# Patient Record
Sex: MALE | Race: WHITE | NOT HISPANIC OR LATINO | Employment: OTHER | URBAN - METROPOLITAN AREA
[De-identification: names, ages, dates, MRNs, and addresses within clinical notes are randomized per-mention and may not be internally consistent; named-entity substitution may affect disease eponyms.]

---

## 2024-07-23 ENCOUNTER — OFFICE VISIT (OUTPATIENT)
Dept: CARDIOLOGY | Facility: CLINIC | Age: 64
End: 2024-07-23
Payer: MEDICARE

## 2024-07-23 VITALS
HEIGHT: 68 IN | OXYGEN SATURATION: 98 % | DIASTOLIC BLOOD PRESSURE: 76 MMHG | WEIGHT: 155 LBS | SYSTOLIC BLOOD PRESSURE: 126 MMHG | HEART RATE: 75 BPM | BODY MASS INDEX: 23.49 KG/M2

## 2024-07-23 DIAGNOSIS — I10 PRIMARY HYPERTENSION: ICD-10-CM

## 2024-07-23 DIAGNOSIS — E78.2 MIXED HYPERLIPIDEMIA: ICD-10-CM

## 2024-07-23 DIAGNOSIS — I25.10 CORONARY ARTERY DISEASE INVOLVING NATIVE CORONARY ARTERY OF NATIVE HEART WITHOUT ANGINA PECTORIS: Primary | ICD-10-CM

## 2024-07-23 PROCEDURE — 93000 ELECTROCARDIOGRAM COMPLETE: CPT | Performed by: INTERNAL MEDICINE

## 2024-07-23 PROCEDURE — 3074F SYST BP LT 130 MM HG: CPT | Performed by: INTERNAL MEDICINE

## 2024-07-23 PROCEDURE — 3078F DIAST BP <80 MM HG: CPT | Performed by: INTERNAL MEDICINE

## 2024-07-23 PROCEDURE — 1160F RVW MEDS BY RX/DR IN RCRD: CPT | Performed by: INTERNAL MEDICINE

## 2024-07-23 PROCEDURE — 99203 OFFICE O/P NEW LOW 30 MIN: CPT | Performed by: INTERNAL MEDICINE

## 2024-07-23 PROCEDURE — 1159F MED LIST DOCD IN RCRD: CPT | Performed by: INTERNAL MEDICINE

## 2024-07-23 RX ORDER — TAMSULOSIN HYDROCHLORIDE 0.4 MG/1
1 CAPSULE ORAL DAILY
COMMUNITY
Start: 2024-07-13

## 2024-07-23 RX ORDER — SODIUM CHLORIDE 1 G/1
1 TABLET ORAL 2 TIMES DAILY
COMMUNITY

## 2024-07-23 RX ORDER — LISINOPRIL 30 MG/1
30 TABLET ORAL DAILY
COMMUNITY
Start: 2024-04-29

## 2024-07-23 RX ORDER — ASPIRIN 81 MG/1
1 TABLET, CHEWABLE ORAL DAILY
COMMUNITY

## 2024-07-23 RX ORDER — CLOPIDOGREL BISULFATE 75 MG/1
1 TABLET ORAL DAILY
COMMUNITY
Start: 2024-06-03

## 2024-07-23 RX ORDER — LEVOTHYROXINE SODIUM 0.1 MG/1
TABLET ORAL EVERY 24 HOURS
COMMUNITY

## 2024-07-23 RX ORDER — EZETIMIBE 10 MG/1
1 TABLET ORAL DAILY
COMMUNITY

## 2024-07-23 RX ORDER — TIZANIDINE 4 MG/1
4 TABLET ORAL EVERY 6 HOURS PRN
COMMUNITY
Start: 2024-06-03

## 2024-07-23 NOTE — PROGRESS NOTES
Cardiology Office Visit      Encounter Date:  07/23/2024    Patient ID:   Deshawn García is a 63 y.o. male.    Reason For Followup:  Coronary artery disease  Shortness of breath      Brief Clinical History:  Dear Viviana Horvath APRN    I had the pleasure of seeing Deshawn García today. As you are well aware, this is a 63 y.o. male with medical history significant for history of hypertension hyperlipidemia history of prior tobacco use prior coronary bypass surgery 8 years back with no recent cardiac evaluation workup here to establish care    Interval History:  No symptoms of fatigue and shortness of breath  No chest discomfort  Significant functional limitation secondary to back and knee pain  Significant recent weight loss  Chronic hyponatremia  Orthopnea no PND  No dizziness or syncope        Assessment & Plan    Impressions:  Artery disease prior coronary bypass surgery  Hypertension  Hyperlipidemia  Prior history of tobacco use  Significant back and knee problems limiting functional physical activity  Chronic hyponatremia  Recent significant weight loss  Coronary artery calcification on recent CT scan  Lung nodule  Intolerance to statins  Patient is currently on TF for elevated lipids    Recommendations:  Follow-up with primary care physician nephrology and pain clinic as scheduled  Schedule patient for an echocardiogram to assess LV systolic function and rule out any significant structural heart disease or valve pathology  Schedule patient for Lexiscan myocardial perfusion study to assess the ischemic burden with a prior history of remote coronary artery bypass surgery with no recent cardiac evaluation and significant functional imitation and cardiac symptoms that are concerning for angina  Continue close monitoring and follow-up  Prior available medical records reviewed and discussed with the patient  Need for follow-up with primary care physician for recent significant weight loss reviewed and discussed with  "patient  Follow-up with nephrology for hyponatremia  Continue current medical therapy with lisinopril 30 mg p.o. once a day aspirin 81 mg p.o. once a day Plavix 75 mg p.o. once a day Zetia 10 mg p.o. once a day  Continue close monitoring and follow-up  Follow-up in office in 3 months        Vitals:  Vitals:    07/23/24 1038   BP: 126/76   Pulse: 75   SpO2: 98%   Weight: 70.3 kg (155 lb)   Height: 172.7 cm (68\")       Physical Exam:    General: Alert, cooperative, no distress, appears stated age  Head:  Normocephalic, atraumatic, mucous membranes moist  Eyes:  Conjunctiva/corneas clear, EOM's intact     Neck:  Supple,  no adenopathy;      Lungs: Clear to auscultation bilaterally, no wheezes rhonchi rales are noted  Chest wall: No tenderness  Heart::  Regular rate and rhythm, S1 and S2 normal, no murmur, rub or gallop  Abdomen: Soft, non-tender, nondistended bowel sounds active  Extremities: No cyanosis, clubbing, or edema  Pulses: 2+ and symmetric all extremities  Skin:  No rashes or lesions  Neuro/psych: A&O x3. CN II through XII are grossly intact with appropriate affect              Lab Results   Component Value Date    BUN 13 02/11/2022    CREATININE 0.74 02/11/2022    BCR 17.6 02/11/2022    K 4.3 02/11/2022    CO2 23 02/11/2022    CALCIUM 8.6 02/11/2022    ALBUMIN 4.2 02/11/2022    BILITOT 0.3 02/11/2022    AST 30 02/11/2022    ALT 12 02/11/2022        No results found for this or any previous visit.     Lab Results   Component Value Date    CHLPL 307 (H) 02/21/2019    TRIG 130 02/21/2019    HDL 41 02/21/2019     (H) 02/21/2019                Objective:          Allergies:  Allergies   Allergen Reactions    Statins Other (See Comments)     Severe myalgia    Codeine Unknown - Low Severity    Sulfamethoxazole-Trimethoprim Nausea Only       Medication Review:     Current Outpatient Medications:     aspirin 81 MG chewable tablet, Chew 1 tablet Daily., Disp: , Rfl:     clopidogrel (PLAVIX) 75 MG tablet, Take 1 " tablet by mouth Daily., Disp: , Rfl:     ezetimibe (ZETIA) 10 MG tablet, Take 1 tablet by mouth Daily., Disp: , Rfl:     levothyroxine (SYNTHROID, LEVOTHROID) 100 MCG tablet, Daily., Disp: , Rfl:     lisinopril (PRINIVIL,ZESTRIL) 30 MG tablet, Take 1 tablet by mouth Daily., Disp: , Rfl:     sodium chloride 1 g tablet, Take 1 tablet by mouth 2 (Two) Times a Day., Disp: , Rfl:     tamsulosin (FLOMAX) 0.4 MG capsule 24 hr capsule, Take 1 capsule by mouth Daily., Disp: , Rfl:     tiZANidine (ZANAFLEX) 4 MG tablet, Take 1 tablet by mouth Every 6 (Six) Hours As Needed for Muscle Spasms., Disp: , Rfl:     Family History:  Family History   Problem Relation Age of Onset    Hyperlipidemia Mother     Hypertension Mother     Heart disease Mother     Heart disease Father        Past Medical History:  Past Medical History:   Diagnosis Date    COPD (chronic obstructive pulmonary disease)     Coronary artery disease     Hyperlipidemia     Hypertension     Myocardial infarction     Stroke        Past surgical History:  Past Surgical History:   Procedure Laterality Date    BACK SURGERY  1987    CARDIAC CATHETERIZATION      CORONARY ARTERY BYPASS GRAFT         Social History:  Social History     Socioeconomic History    Marital status:    Tobacco Use    Smoking status: Former     Types: Cigarettes     Passive exposure: Past    Smokeless tobacco: Current   Vaping Use    Vaping status: Never Used   Substance and Sexual Activity    Alcohol use: Not Currently    Drug use: Not Currently     Types: Marijuana    Sexual activity: Defer       Review of Systems:  The following systems were reviewed as they relate to the cardiovascular system: Constitutional, Eyes, ENT, Cardiovascular, Respiratory, Gastrointestinal, Integumentary, Neurological, Psychiatric, Hematologic, Endocrine, Musculoskeletal, and Genitourinary. The pertinent cardiovascular findings are reported above with all other cardiovascular points within those systems being  negative.    Diagnostic Study Review:     Current Electrocardiogram:    ECG 12 Lead    Date/Time: 7/23/2024 10:49 AM  Performed by: Yin Bourgeois MD    Authorized by: Yin Bourgeois MD  Comparison: compared with previous ECG   Similar to previous ECG  Rhythm: sinus rhythm  Rate: normal  BPM: 82  Conduction: conduction normal  ST Segments: ST segments normal  T Waves: T waves normal  QRS axis: normal    Clinical impression: normal ECG                NOTE: The following portions of the patient's history were reviewed and updated this visit as appropriate: allergies, current medications, past family history, past medical history, past social history, past surgical history and problem list.

## 2024-07-24 ENCOUNTER — PATIENT ROUNDING (BHMG ONLY) (OUTPATIENT)
Dept: CARDIOLOGY | Facility: CLINIC | Age: 64
End: 2024-07-24
Payer: MEDICARE

## 2024-07-24 NOTE — PROGRESS NOTES
A My-Chart message has been sent to the patient for PATIENT ROUNDING with [facility].

## 2024-08-05 ENCOUNTER — APPOINTMENT (OUTPATIENT)
Dept: CARDIOLOGY | Facility: HOSPITAL | Age: 64
End: 2024-08-05
Payer: MEDICARE

## 2024-08-05 ENCOUNTER — HOSPITAL ENCOUNTER (OUTPATIENT)
Dept: CARDIOLOGY | Facility: HOSPITAL | Age: 64
Discharge: HOME | End: 2024-08-05
Admitting: INTERNAL MEDICINE
Payer: MEDICARE

## 2024-08-05 VITALS
BODY MASS INDEX: 23.49 KG/M2 | DIASTOLIC BLOOD PRESSURE: 76 MMHG | SYSTOLIC BLOOD PRESSURE: 126 MMHG | HEIGHT: 68 IN | WEIGHT: 155 LBS

## 2024-08-05 DIAGNOSIS — I10 PRIMARY HYPERTENSION: ICD-10-CM

## 2024-08-05 DIAGNOSIS — I25.10 CORONARY ARTERY DISEASE INVOLVING NATIVE CORONARY ARTERY OF NATIVE HEART WITHOUT ANGINA PECTORIS: ICD-10-CM

## 2024-08-05 DIAGNOSIS — E78.2 MIXED HYPERLIPIDEMIA: ICD-10-CM

## 2024-08-05 LAB
BH CV ECHO LEFT VENTRICLE GLOBAL LONGITUDINAL STRAIN: 18.6 %
BH CV ECHO MEAS - ACS: 1.79 CM
BH CV ECHO MEAS - AI P1/2T: 601.2 MSEC
BH CV ECHO MEAS - AO MAX PG: 6.5 MMHG
BH CV ECHO MEAS - AO MEAN PG: 3.6 MMHG
BH CV ECHO MEAS - AO ROOT DIAM: 3.4 CM
BH CV ECHO MEAS - AO V2 MAX: 127.9 CM/SEC
BH CV ECHO MEAS - AO V2 VTI: 25.3 CM
BH CV ECHO MEAS - AVA(I,D): 2.49 CM2
BH CV ECHO MEAS - EDV(CUBED): 154.9 ML
BH CV ECHO MEAS - EDV(MOD-SP4): 78.6 ML
BH CV ECHO MEAS - EF(MOD-BP): 61 %
BH CV ECHO MEAS - EF(MOD-SP4): 60.6 %
BH CV ECHO MEAS - ESV(CUBED): 47.8 ML
BH CV ECHO MEAS - ESV(MOD-SP4): 31 ML
BH CV ECHO MEAS - FS: 32.4 %
BH CV ECHO MEAS - IVS/LVPW: 0.99 CM
BH CV ECHO MEAS - IVSD: 0.97 CM
BH CV ECHO MEAS - LA DIMENSION: 4.4 CM
BH CV ECHO MEAS - LV DIASTOLIC VOL/BSA (35-75): 42.9 CM2
BH CV ECHO MEAS - LV MASS(C)D: 196.8 GRAMS
BH CV ECHO MEAS - LV MAX PG: 3.6 MMHG
BH CV ECHO MEAS - LV MEAN PG: 1.98 MMHG
BH CV ECHO MEAS - LV SYSTOLIC VOL/BSA (12-30): 16.9 CM2
BH CV ECHO MEAS - LV V1 MAX: 95 CM/SEC
BH CV ECHO MEAS - LV V1 VTI: 19.2 CM
BH CV ECHO MEAS - LVIDD: 5.4 CM
BH CV ECHO MEAS - LVIDS: 3.6 CM
BH CV ECHO MEAS - LVOT AREA: 3.3 CM2
BH CV ECHO MEAS - LVOT DIAM: 2.04 CM
BH CV ECHO MEAS - LVPWD: 0.98 CM
BH CV ECHO MEAS - MR MAX PG: 123.5 MMHG
BH CV ECHO MEAS - MR MAX VEL: 555.5 CM/SEC
BH CV ECHO MEAS - MV A MAX VEL: 60.5 CM/SEC
BH CV ECHO MEAS - MV DEC SLOPE: 141.8 CM/SEC2
BH CV ECHO MEAS - MV DEC TIME: 0.28 SEC
BH CV ECHO MEAS - MV E MAX VEL: 39.4 CM/SEC
BH CV ECHO MEAS - MV E/A: 0.65
BH CV ECHO MEAS - MV MAX PG: 1.82 MMHG
BH CV ECHO MEAS - MV MEAN PG: 0.69 MMHG
BH CV ECHO MEAS - MV V2 VTI: 21 CM
BH CV ECHO MEAS - MVA(VTI): 3 CM2
BH CV ECHO MEAS - PA ACC TIME: 0.13 SEC
BH CV ECHO MEAS - PA V2 MAX: 82 CM/SEC
BH CV ECHO MEAS - RAP SYSTOLE: 3 MMHG
BH CV ECHO MEAS - RV MAX PG: 2.3 MMHG
BH CV ECHO MEAS - RV V1 MAX: 75.9 CM/SEC
BH CV ECHO MEAS - RV V1 VTI: 16.2 CM
BH CV ECHO MEAS - RVDD: 3.3 CM
BH CV ECHO MEAS - RVSP: 20.6 MMHG
BH CV ECHO MEAS - SV(LVOT): 62.9 ML
BH CV ECHO MEAS - SV(MOD-SP4): 47.6 ML
BH CV ECHO MEAS - SVI(LVOT): 34.3 ML/M2
BH CV ECHO MEAS - SVI(MOD-SP4): 26 ML/M2
BH CV ECHO MEAS - TR MAX PG: 17.6 MMHG
BH CV ECHO MEAS - TR MAX VEL: 209.6 CM/SEC

## 2024-08-05 PROCEDURE — 93356 MYOCRD STRAIN IMG SPCKL TRCK: CPT

## 2024-08-05 PROCEDURE — 93306 TTE W/DOPPLER COMPLETE: CPT | Performed by: INTERNAL MEDICINE

## 2024-08-05 PROCEDURE — 93356 MYOCRD STRAIN IMG SPCKL TRCK: CPT | Performed by: INTERNAL MEDICINE

## 2024-08-05 PROCEDURE — 93306 TTE W/DOPPLER COMPLETE: CPT

## 2024-08-08 ENCOUNTER — HOSPITAL ENCOUNTER (OUTPATIENT)
Dept: CARDIOLOGY | Facility: HOSPITAL | Age: 64
Discharge: HOME | End: 2024-08-08
Payer: MEDICARE

## 2024-08-08 DIAGNOSIS — E78.2 MIXED HYPERLIPIDEMIA: ICD-10-CM

## 2024-08-08 DIAGNOSIS — I25.10 CORONARY ARTERY DISEASE INVOLVING NATIVE CORONARY ARTERY OF NATIVE HEART WITHOUT ANGINA PECTORIS: ICD-10-CM

## 2024-08-08 DIAGNOSIS — I10 PRIMARY HYPERTENSION: ICD-10-CM

## 2024-08-08 LAB
BH CV REST NUCLEAR ISOTOPE DOSE: 11.7 MCI
BH CV STRESS COMMENTS STAGE 1: NORMAL
BH CV STRESS DOSE REGADENOSON STAGE 1: 0.4
BH CV STRESS DURATION MIN STAGE 1: 0
BH CV STRESS DURATION SEC STAGE 1: 10
BH CV STRESS NUCLEAR ISOTOPE DOSE: 33.8 MCI
BH CV STRESS PROTOCOL 1: NORMAL
BH CV STRESS RECOVERY BP: NORMAL MMHG
BH CV STRESS RECOVERY HR: 75 BPM
BH CV STRESS STAGE 1: 1
LV EF NUC BP: 61 %
MAXIMAL PREDICTED HEART RATE: 156 BPM
PERCENT MAX PREDICTED HR: 58.97 %
STRESS BASELINE BP: NORMAL MMHG
STRESS BASELINE HR: 74 BPM
STRESS PERCENT HR: 69 %
STRESS POST PEAK BP: NORMAL MMHG
STRESS POST PEAK HR: 92 BPM
STRESS TARGET HR: 133 BPM

## 2024-08-08 PROCEDURE — 25010000002 REGADENOSON 0.4 MG/5ML SOLUTION: Performed by: INTERNAL MEDICINE

## 2024-08-08 PROCEDURE — 0 TECHNETIUM SESTAMIBI: Performed by: INTERNAL MEDICINE

## 2024-08-08 PROCEDURE — A9500 TC99M SESTAMIBI: HCPCS | Performed by: INTERNAL MEDICINE

## 2024-08-08 PROCEDURE — 78452 HT MUSCLE IMAGE SPECT MULT: CPT

## 2024-08-08 PROCEDURE — 93017 CV STRESS TEST TRACING ONLY: CPT

## 2024-08-08 RX ORDER — REGADENOSON 0.08 MG/ML
0.4 INJECTION, SOLUTION INTRAVENOUS
Status: COMPLETED | OUTPATIENT
Start: 2024-08-08 | End: 2024-08-08

## 2024-08-08 RX ADMIN — TECHNETIUM TC 99M SESTAMIBI 1 DOSE: 1 INJECTION INTRAVENOUS at 11:53

## 2024-08-08 RX ADMIN — TECHNETIUM TC 99M SESTAMIBI 1 DOSE: 1 INJECTION INTRAVENOUS at 10:46

## 2024-08-08 RX ADMIN — REGADENOSON 0.4 MG: 0.08 INJECTION, SOLUTION INTRAVENOUS at 11:52

## 2024-12-26 ENCOUNTER — OFFICE VISIT (OUTPATIENT)
Dept: CARDIOLOGY | Facility: CLINIC | Age: 64
End: 2024-12-26
Payer: MEDICARE

## 2024-12-26 VITALS
HEART RATE: 61 BPM | HEIGHT: 68 IN | WEIGHT: 164.4 LBS | BODY MASS INDEX: 24.92 KG/M2 | SYSTOLIC BLOOD PRESSURE: 159 MMHG | OXYGEN SATURATION: 98 % | DIASTOLIC BLOOD PRESSURE: 90 MMHG

## 2024-12-26 DIAGNOSIS — E78.2 MIXED HYPERLIPIDEMIA: ICD-10-CM

## 2024-12-26 DIAGNOSIS — I10 PRIMARY HYPERTENSION: ICD-10-CM

## 2024-12-26 DIAGNOSIS — I25.10 CORONARY ARTERY DISEASE INVOLVING NATIVE CORONARY ARTERY OF NATIVE HEART WITHOUT ANGINA PECTORIS: Primary | ICD-10-CM

## 2024-12-26 PROCEDURE — 99214 OFFICE O/P EST MOD 30 MIN: CPT | Performed by: INTERNAL MEDICINE

## 2024-12-26 PROCEDURE — 3080F DIAST BP >= 90 MM HG: CPT | Performed by: INTERNAL MEDICINE

## 2024-12-26 PROCEDURE — 1160F RVW MEDS BY RX/DR IN RCRD: CPT | Performed by: INTERNAL MEDICINE

## 2024-12-26 PROCEDURE — 93000 ELECTROCARDIOGRAM COMPLETE: CPT | Performed by: INTERNAL MEDICINE

## 2024-12-26 PROCEDURE — 1159F MED LIST DOCD IN RCRD: CPT | Performed by: INTERNAL MEDICINE

## 2024-12-26 PROCEDURE — 3077F SYST BP >= 140 MM HG: CPT | Performed by: INTERNAL MEDICINE

## 2024-12-26 NOTE — PROGRESS NOTES
Cardiology Office Visit      Encounter Date:  12/26/2024    Patient ID:   Deshawn García is a 64 y.o. male.    Reason For Followup:  Coronary artery disease        Brief Clinical History:  Dear Viviana Horvath APRN    I had the pleasure of seeing Deshawn García today. As you are well aware, this is a 63 y.o. male with medical history significant for history of hypertension hyperlipidemia history of prior tobacco use prior coronary bypass here for follow-up    Interval History:  Denies any new cardiac symptoms  Denies any symptoms of chest pain shortness of breath dizziness or syncope  Back pain has improved  Functional capacity has improved  No exertional cardiac symptoms      Assessment & Plan    Impressions:  Coronary artery disease prior coronary bypass surgery  Hypertension  Hyperlipidemia  Prior history of tobacco use  Significant back and knee problems limiting functional physical activity  Chronic hyponatremia  Recent significant weight loss  Coronary artery calcification on recent CT scan  Lung nodule  Intolerance to statins  Patient is currently on Zetia for elevated lipids  Echocardiogram with normal LV systolic function  Myocardial perfusion study with no significant reversible ischemia    Recommendations:  Follow-up with primary care physician nephrology and pain clinic as scheduled  Continue close monitoring and follow-up  Prior available medical records reviewed and discussed with the patient  Need for follow-up with primary care physician for recent significant weight loss reviewed and discussed with patient  Follow-up with nephrology for hyponatremia  Continue current medical therapy with lisinopril 30 mg p.o. once a day aspirin 81 mg p.o. once a day Plavix 75 mg p.o. once a day Zetia 10 mg p.o. once a day  Home blood pressure monitoring  Patient is advised to call back with home blood pressure readings in the next 2 weeks  Follow-up with primary care physician as scheduled  Results of the  "echocardiogram and stress test reviewed and discussed with patient  Continue aggressive risk factor modification  Continue close monitoring and follow-up  Follow-up in office in 6 months        Vitals:  Vitals:    12/26/24 1320   BP: 159/90   Pulse: 61   SpO2: 98%   Weight: 74.6 kg (164 lb 6.4 oz)   Height: 172.7 cm (68\")       Physical Exam:    General: Alert, cooperative, no distress, appears stated age  Head:  Normocephalic, atraumatic, mucous membranes moist  Eyes:  Conjunctiva/corneas clear, EOM's intact     Neck:  Supple,  no adenopathy;      Lungs: Clear to auscultation bilaterally, no wheezes rhonchi rales are noted  Chest wall: No tenderness  Heart::  Regular rate and rhythm, S1 and S2 normal, no murmur, rub or gallop  Abdomen: Soft, non-tender, nondistended bowel sounds active  Extremities: No cyanosis, clubbing, or edema  Pulses: 2+ and symmetric all extremities  Skin:  No rashes or lesions  Neuro/psych: A&O x3. CN II through XII are grossly intact with appropriate affect              Lab Results   Component Value Date    BUN 13 02/11/2022    CREATININE 0.74 02/11/2022    BCR 17.6 02/11/2022    K 4.3 02/11/2022    CO2 23 02/11/2022    CALCIUM 8.6 02/11/2022    ALBUMIN 4.2 02/11/2022    BILITOT 0.3 02/11/2022    AST 30 02/11/2022    ALT 12 02/11/2022     Results for orders placed during the hospital encounter of 08/05/24    Adult Transthoracic Echo Complete W/ Cont if Necessary Per Protocol    Interpretation Summary    Left ventricular systolic function is normal. Calculated left ventricular EF = 61% Left ventricular ejection fraction appears to be 61 - 65%.    Left ventricular diastolic function is consistent with (grade I) impaired relaxation.    The left atrial cavity is moderately dilated.    Estimated right ventricular systolic pressure from tricuspid regurgitation is normal (<35 mmHg).     No results found for this or any previous visit.     Lab Results   Component Value Date    CHLPL 307 (H) " 02/21/2019    TRIG 130 02/21/2019    HDL 41 02/21/2019     (H) 02/21/2019      Results for orders placed during the hospital encounter of 08/08/24    Stress Test With Myocardial Perfusion One Day    Interpretation Summary    Myocardial perfusion imaging indicates a normal myocardial perfusion study with no evidence of ischemia. Impressions are consistent with a low risk study.    Left ventricular ejection fraction is normal (Calculated EF = 61%).            Objective:          Allergies:  Allergies   Allergen Reactions    Statins Other (See Comments)     Severe myalgia    Codeine Unknown - Low Severity    Sulfamethoxazole-Trimethoprim Nausea Only       Medication Review:     Current Outpatient Medications:     aspirin 81 MG chewable tablet, Chew 1 tablet Daily., Disp: , Rfl:     clopidogrel (PLAVIX) 75 MG tablet, Take 1 tablet by mouth Daily., Disp: , Rfl:     ezetimibe (ZETIA) 10 MG tablet, Take 1 tablet by mouth Daily., Disp: , Rfl:     levothyroxine (SYNTHROID, LEVOTHROID) 100 MCG tablet, Daily., Disp: , Rfl:     lisinopril (PRINIVIL,ZESTRIL) 30 MG tablet, Take 1 tablet by mouth Daily., Disp: , Rfl:     sodium chloride 1 g tablet, Take 1 tablet by mouth 2 (Two) Times a Day., Disp: , Rfl:     tamsulosin (FLOMAX) 0.4 MG capsule 24 hr capsule, Take 1 capsule by mouth Daily., Disp: , Rfl:     tiZANidine (ZANAFLEX) 4 MG tablet, Take 1 tablet by mouth Every 6 (Six) Hours As Needed for Muscle Spasms., Disp: , Rfl:     Family History:  Family History   Problem Relation Age of Onset    Hyperlipidemia Mother     Hypertension Mother     Heart disease Mother     Heart disease Father        Past Medical History:  Past Medical History:   Diagnosis Date    COPD (chronic obstructive pulmonary disease)     Coronary artery disease     Hyperlipidemia     Hypertension     Myocardial infarction     Stroke        Past surgical History:  Past Surgical History:   Procedure Laterality Date    BACK SURGERY  1987    CARDIAC  CATHETERIZATION      CORONARY ARTERY BYPASS GRAFT         Social History:  Social History     Socioeconomic History    Marital status:    Tobacco Use    Smoking status: Former     Types: Cigarettes     Passive exposure: Past    Smokeless tobacco: Current     Types: Chew   Vaping Use    Vaping status: Never Used   Substance and Sexual Activity    Alcohol use: Not Currently    Drug use: Not Currently     Types: Marijuana    Sexual activity: Defer       Review of Systems:  The following systems were reviewed as they relate to the cardiovascular system: Constitutional, Eyes, ENT, Cardiovascular, Respiratory, Gastrointestinal, Integumentary, Neurological, Psychiatric, Hematologic, Endocrine, Musculoskeletal, and Genitourinary. The pertinent cardiovascular findings are reported above with all other cardiovascular points within those systems being negative.    Diagnostic Study Review:     Current Electrocardiogram:    ECG 12 Lead    Date/Time: 12/26/2024 1:52 PM  Performed by: Yin Bourgeois MD    Authorized by: Yin Bourgeois MD  Comparison: compared with previous ECG   Similar to previous ECG  Rhythm: sinus rhythm  Rate: normal  BPM: 61  Conduction: conduction normal  QRS axis: normal  Other findings: non-specific ST-T wave changes    Clinical impression: abnormal EKG                NOTE: The following portions of the patient's history were reviewed and updated this visit as appropriate: allergies, current medications, past family history, past medical history, past social history, past surgical history and problem list.

## 2025-06-26 ENCOUNTER — OFFICE VISIT (OUTPATIENT)
Dept: CARDIOLOGY | Facility: CLINIC | Age: 65
End: 2025-06-26
Payer: MEDICARE

## 2025-06-26 VITALS
HEIGHT: 68 IN | DIASTOLIC BLOOD PRESSURE: 96 MMHG | SYSTOLIC BLOOD PRESSURE: 144 MMHG | OXYGEN SATURATION: 98 % | HEART RATE: 76 BPM | WEIGHT: 179.2 LBS | BODY MASS INDEX: 27.16 KG/M2

## 2025-06-26 DIAGNOSIS — I10 PRIMARY HYPERTENSION: ICD-10-CM

## 2025-06-26 DIAGNOSIS — E78.5 HYPERLIPIDEMIA LDL GOAL <100: ICD-10-CM

## 2025-06-26 DIAGNOSIS — I25.10 CORONARY ARTERY DISEASE INVOLVING NATIVE CORONARY ARTERY OF NATIVE HEART WITHOUT ANGINA PECTORIS: Primary | ICD-10-CM

## 2025-06-26 DIAGNOSIS — E78.2 MIXED HYPERLIPIDEMIA: ICD-10-CM

## 2025-06-26 NOTE — PROGRESS NOTES
Cardiology Office Visit      Encounter Date:  06/26/2025    Patient ID:   Deshawn García is a 64 y.o. male.    Reason For Followup:  Coronary artery disease        Brief Clinical History:  Dear Viviana Horvath APRN    I had the pleasure of seeing Deshawn García today. As you are well aware, this is a 64 y.o. male with medical history significant for history of hypertension hyperlipidemia history of prior tobacco use prior coronary bypass here for follow-up    Interval History:  Denies any new cardiac symptoms  Denies any symptoms of chest pain shortness of breath dizziness or syncope  Back pain has improved  Functional capacity has improved  No exertional cardiac symptoms      Assessment & Plan    Impressions:  Coronary artery disease prior coronary bypass surgery  Hypertension  Hyperlipidemia  Prior history of tobacco use  Significant back and knee problems limiting functional physical activity  Chronic hyponatremia  Recent significant weight loss  Coronary artery calcification on recent CT scan  Lung nodule  Intolerance to statins  Patient is currently on Zetia for elevated lipids  Echocardiogram with normal LV systolic function  Myocardial perfusion study with no significant reversible ischemia  Hyponatremia  Episode of hypotension during rehab recently could be vasovagal syncope    Recommendations:  Follow-up with primary care physician nephrology and pain clinic as scheduled  Continue close monitoring and follow-up  Prior available medical records reviewed and discussed with the patient  Follow-up with nephrology for hyponatremia  Continue current medical therapy with lisinopril 30 mg p.o. once a day aspirin 81 mg p.o. once a day Plavix 75 mg p.o. once a day Zetia 10 mg p.o. once a day  Home blood pressure monitoring  Patient is advised to call back with home blood pressure readings in the next 2 weeks  Follow-up with primary care physician as scheduled  Results of the echocardiogram and stress test reviewed and  "discussed with patient  Continue aggressive risk factor modification  Continue close monitoring and follow-up  Monitoring of blood pressure at home and call back office with blood pressure readings  Check labs including CBC CMP and lipids and thyroid function  Follow-up with nephrology primary care physician as scheduled  Need for close monitoring and follow-up reviewed and discussed with patient  Prior medical records reviewed and discussed with patient  Close monitoring of blood pressure at home  Follow-up in office in 6 months        Vitals:  Vitals:    06/26/25 1336   BP: 144/96   BP Location: Left arm   Patient Position: Sitting   Cuff Size: Adult   Pulse: 76   SpO2: 98%   Weight: 81.3 kg (179 lb 3.2 oz)   Height: 172.7 cm (68\")       Physical Exam:    General: Alert, cooperative, no distress, appears stated age  Head:  Normocephalic, atraumatic, mucous membranes moist  Eyes:  Conjunctiva/corneas clear, EOM's intact     Neck:  Supple,  no adenopathy;      Lungs: Clear to auscultation bilaterally, no wheezes rhonchi rales are noted  Chest wall: No tenderness  Heart::  Regular rate and rhythm, S1 and S2 normal, no murmur, rub or gallop  Abdomen: Soft, non-tender, nondistended bowel sounds active  Extremities: No cyanosis, clubbing, or edema  Pulses: 2+ and symmetric all extremities  Skin:  No rashes or lesions  Neuro/psych: A&O x3. CN II through XII are grossly intact with appropriate affect              Lab Results   Component Value Date    BUN 13 02/11/2022    CREATININE 0.74 02/11/2022    BCR 17.6 02/11/2022    K 4.3 02/11/2022    CO2 23 02/11/2022    CALCIUM 8.6 02/11/2022    ALBUMIN 4.2 02/11/2022    BILITOT 0.3 02/11/2022    AST 30 02/11/2022    ALT 12 02/11/2022     Results for orders placed during the hospital encounter of 08/05/24    Adult Transthoracic Echo Complete W/ Cont if Necessary Per Protocol    Interpretation Summary    Left ventricular systolic function is normal. Calculated left ventricular EF " = 61% Left ventricular ejection fraction appears to be 61 - 65%.    Left ventricular diastolic function is consistent with (grade I) impaired relaxation.    The left atrial cavity is moderately dilated.    Estimated right ventricular systolic pressure from tricuspid regurgitation is normal (<35 mmHg).     No results found for this or any previous visit.     Lab Results   Component Value Date    CHLPL 307 (H) 02/21/2019    TRIG 130 02/21/2019    HDL 41 02/21/2019     (H) 02/21/2019      Results for orders placed during the hospital encounter of 08/08/24    Stress Test With Myocardial Perfusion One Day    Interpretation Summary    Myocardial perfusion imaging indicates a normal myocardial perfusion study with no evidence of ischemia. Impressions are consistent with a low risk study.    Left ventricular ejection fraction is normal (Calculated EF = 61%).            Objective:          Allergies:  Allergies   Allergen Reactions    Statins Other (See Comments)     Severe myalgia    Codeine Unknown - Low Severity    Sulfamethoxazole-Trimethoprim Nausea Only       Medication Review:     Current Outpatient Medications:     aspirin 81 MG chewable tablet, Chew 1 tablet Daily., Disp: , Rfl:     clopidogrel (PLAVIX) 75 MG tablet, Take 1 tablet by mouth Daily., Disp: , Rfl:     ezetimibe (ZETIA) 10 MG tablet, Take 1 tablet by mouth Daily., Disp: , Rfl:     levothyroxine (SYNTHROID, LEVOTHROID) 100 MCG tablet, Daily., Disp: , Rfl:     lisinopril (PRINIVIL,ZESTRIL) 30 MG tablet, Take 1 tablet by mouth Daily., Disp: , Rfl:     sodium chloride 1 g tablet, Take 1 tablet by mouth 2 (Two) Times a Day., Disp: , Rfl:     tamsulosin (FLOMAX) 0.4 MG capsule 24 hr capsule, Take 1 capsule by mouth Daily., Disp: , Rfl:     tiZANidine (ZANAFLEX) 4 MG tablet, Take 1 tablet by mouth Every 6 (Six) Hours As Needed for Muscle Spasms., Disp: , Rfl:     Family History:  Family History   Problem Relation Age of Onset    Hyperlipidemia Mother      Hypertension Mother     Heart disease Mother     Heart disease Father        Past Medical History:  Past Medical History:   Diagnosis Date    COPD (chronic obstructive pulmonary disease)     Coronary artery disease     Hyperlipidemia     Hypertension     Myocardial infarction     Stroke        Past surgical History:  Past Surgical History:   Procedure Laterality Date    BACK SURGERY  1987    CARDIAC CATHETERIZATION      CORONARY ARTERY BYPASS GRAFT         Social History:  Social History     Socioeconomic History    Marital status:    Tobacco Use    Smoking status: Former     Types: Cigarettes     Passive exposure: Past    Smokeless tobacco: Current     Types: Chew   Vaping Use    Vaping status: Never Used   Substance and Sexual Activity    Alcohol use: Not Currently    Drug use: Not Currently     Types: Marijuana    Sexual activity: Defer       Review of Systems:  The following systems were reviewed as they relate to the cardiovascular system: Constitutional, Eyes, ENT, Cardiovascular, Respiratory, Gastrointestinal, Integumentary, Neurological, Psychiatric, Hematologic, Endocrine, Musculoskeletal, and Genitourinary. The pertinent cardiovascular findings are reported above with all other cardiovascular points within those systems being negative.    Diagnostic Study Review:     Current Electrocardiogram:    ECG 12 Lead    Date/Time: 6/26/2025 2:46 PM  Performed by: Yin Bourgeois MD    Authorized by: Yin Bourgeois MD  Comparison: compared with previous ECG   Similar to previous ECG  Rhythm: sinus rhythm  Rate: normal  BPM: 76  Conduction: conduction normal  QRS axis: normal  Other findings: non-specific ST-T wave changes    Clinical impression: abnormal EKG                NOTE: The following portions of the patient's history were reviewed and updated this visit as appropriate: allergies, current medications, past family history, past medical history, past social history, past surgical  history and problem list.